# Patient Record
Sex: FEMALE | Race: OTHER
[De-identification: names, ages, dates, MRNs, and addresses within clinical notes are randomized per-mention and may not be internally consistent; named-entity substitution may affect disease eponyms.]

---

## 2022-09-01 ENCOUNTER — HOSPITAL ENCOUNTER (INPATIENT)
Dept: HOSPITAL 54 - TELE | Age: 67
LOS: 2 days | Discharge: HOME | DRG: 47 | End: 2022-09-03
Attending: NURSE PRACTITIONER | Admitting: NURSE PRACTITIONER
Payer: MEDICAID

## 2022-09-01 VITALS — BODY MASS INDEX: 31.15 KG/M2 | WEIGHT: 165 LBS | HEIGHT: 61 IN

## 2022-09-01 DIAGNOSIS — R20.2: ICD-10-CM

## 2022-09-01 DIAGNOSIS — E11.65: ICD-10-CM

## 2022-09-01 DIAGNOSIS — M54.10: ICD-10-CM

## 2022-09-01 DIAGNOSIS — N39.0: ICD-10-CM

## 2022-09-01 DIAGNOSIS — Z79.84: ICD-10-CM

## 2022-09-01 DIAGNOSIS — E87.1: ICD-10-CM

## 2022-09-01 DIAGNOSIS — I10: ICD-10-CM

## 2022-09-01 DIAGNOSIS — G45.9: Primary | ICD-10-CM

## 2022-09-01 DIAGNOSIS — E83.42: ICD-10-CM

## 2022-09-01 DIAGNOSIS — D64.9: ICD-10-CM

## 2022-09-01 DIAGNOSIS — E03.8: ICD-10-CM

## 2022-09-01 DIAGNOSIS — R29.700: ICD-10-CM

## 2022-09-01 DIAGNOSIS — E11.41: ICD-10-CM

## 2022-09-01 PROCEDURE — C9113 INJ PANTOPRAZOLE SODIUM, VIA: HCPCS

## 2022-09-01 PROCEDURE — G0378 HOSPITAL OBSERVATION PER HR: HCPCS

## 2022-09-02 VITALS — SYSTOLIC BLOOD PRESSURE: 96 MMHG | DIASTOLIC BLOOD PRESSURE: 50 MMHG

## 2022-09-02 VITALS — DIASTOLIC BLOOD PRESSURE: 69 MMHG | SYSTOLIC BLOOD PRESSURE: 142 MMHG

## 2022-09-02 VITALS — DIASTOLIC BLOOD PRESSURE: 50 MMHG | SYSTOLIC BLOOD PRESSURE: 96 MMHG

## 2022-09-02 VITALS — SYSTOLIC BLOOD PRESSURE: 104 MMHG | DIASTOLIC BLOOD PRESSURE: 46 MMHG

## 2022-09-02 VITALS — SYSTOLIC BLOOD PRESSURE: 142 MMHG | DIASTOLIC BLOOD PRESSURE: 69 MMHG

## 2022-09-02 VITALS — DIASTOLIC BLOOD PRESSURE: 64 MMHG | SYSTOLIC BLOOD PRESSURE: 114 MMHG

## 2022-09-02 VITALS — DIASTOLIC BLOOD PRESSURE: 52 MMHG | SYSTOLIC BLOOD PRESSURE: 102 MMHG

## 2022-09-02 VITALS — SYSTOLIC BLOOD PRESSURE: 119 MMHG | DIASTOLIC BLOOD PRESSURE: 63 MMHG

## 2022-09-02 LAB
ALBUMIN SERPL BCP-MCNC: 2.7 G/DL (ref 3.4–5)
ALP SERPL-CCNC: 61 U/L (ref 46–116)
ALT SERPL W P-5'-P-CCNC: 16 U/L (ref 12–78)
AST SERPL W P-5'-P-CCNC: 9 U/L (ref 15–37)
BASOPHILS # BLD AUTO: 0 K/UL (ref 0–0.2)
BASOPHILS NFR BLD AUTO: 0.2 % (ref 0–2)
BILIRUB SERPL-MCNC: 0.4 MG/DL (ref 0.2–1)
BUN SERPL-MCNC: 15 MG/DL (ref 7–18)
CALCIUM SERPL-MCNC: 8.2 MG/DL (ref 8.5–10.1)
CHLORIDE SERPL-SCNC: 103 MMOL/L (ref 98–107)
CHOLEST SERPL-MCNC: 145 MG/DL (ref ?–200)
CO2 SERPL-SCNC: 23 MMOL/L (ref 21–32)
CREAT SERPL-MCNC: 0.8 MG/DL (ref 0.6–1.3)
EOSINOPHIL NFR BLD AUTO: 1.3 % (ref 0–6)
GLUCOSE SERPL-MCNC: 268 MG/DL (ref 74–106)
HCT VFR BLD AUTO: 35 % (ref 33–45)
HDLC SERPL-MCNC: 46 MG/DL (ref 40–60)
HGB BLD-MCNC: 11.4 G/DL (ref 11.5–14.8)
LDLC SERPL DIRECT ASSAY-MCNC: 72 MG/DL (ref 0–99)
LYMPHOCYTES NFR BLD AUTO: 1.5 K/UL (ref 0.8–4.8)
LYMPHOCYTES NFR BLD AUTO: 29.2 % (ref 20–44)
MAGNESIUM SERPL-MCNC: 1.6 MG/DL (ref 1.8–2.4)
MCHC RBC AUTO-ENTMCNC: 33 G/DL (ref 31–36)
MCV RBC AUTO: 87 FL (ref 82–100)
MONOCYTES NFR BLD AUTO: 0.4 K/UL (ref 0.1–1.3)
MONOCYTES NFR BLD AUTO: 8.2 % (ref 2–12)
NEUTROPHILS # BLD AUTO: 3.1 K/UL (ref 1.8–8.9)
NEUTROPHILS NFR BLD AUTO: 61.1 % (ref 43–81)
PLATELET # BLD AUTO: 229 K/UL (ref 150–450)
POTASSIUM SERPL-SCNC: 3.6 MMOL/L (ref 3.5–5.1)
PROT SERPL-MCNC: 6 G/DL (ref 6.4–8.2)
RBC # BLD AUTO: 3.99 MIL/UL (ref 4–5.2)
SODIUM SERPL-SCNC: 135 MMOL/L (ref 136–145)
TRIGL SERPL-MCNC: 257 MG/DL (ref 30–150)
TSH SERPL DL<=0.005 MIU/L-ACNC: 4.81 UIU/ML (ref 0.36–3.74)
WBC NRBC COR # BLD AUTO: 5.1 K/UL (ref 4.3–11)

## 2022-09-02 RX ADMIN — SODIUM CHLORIDE SCH MG: 9 INJECTION, SOLUTION INTRAVENOUS at 08:12

## 2022-09-02 RX ADMIN — Medication SCH EACH: at 21:16

## 2022-09-02 RX ADMIN — CLOPIDOGREL BISULFATE SCH MG: 75 TABLET, FILM COATED ORAL at 08:12

## 2022-09-02 RX ADMIN — Medication SCH EACH: at 22:22

## 2022-09-02 RX ADMIN — Medication SCH EACH: at 12:26

## 2022-09-02 RX ADMIN — Medication SCH EACH: at 16:51

## 2022-09-02 RX ADMIN — Medication SCH EACH: at 05:44

## 2022-09-02 RX ADMIN — Medication SCH MG: at 08:12

## 2022-09-02 RX ADMIN — NITROFURANTOIN MONOHYDRATE AND NITROFURANTOIN, MACROCRYSTALLINE SCH MG: 75; 25 CAPSULE ORAL at 21:15

## 2022-09-02 RX ADMIN — ACETAMINOPHEN PRN MG: 325 TABLET ORAL at 08:12

## 2022-09-02 RX ADMIN — INSULIN HUMAN PRN UNITS: 100 INJECTION, SOLUTION PARENTERAL at 22:25

## 2022-09-02 NOTE — NUR
TELE RN CLOSING NOTES



PATIENT RESTING IN BED, RESPONDS TO VERBAL AND TACTILE STIMULI. A/Ox3, Guatemalan SPEAKING, 
DAUGHTER AT BEDSIDE. STABLE ON ROOM AIR, NO S/S OF SOB OR RESPIRATORY DISTRESS. IV ACCESS 
RAC #20 SL. INTACT AND PATENT. PATIENT IS ON TELE MONITORING SHOWING SR 72, NO S/S OF 
CARDIAC DISTRESS. AMBULATORY WITH ASSIST, USES BEDSIDE COMMODE OR BATHROOM WITH ASSISTANCE. 
SKIN IS INTACT. SAFETY MEASURES MAINTAINED: BED LOCKED AND IN LOWEST POSITION, CALL LIGHT 
WITHIN REACH, HOB ELEVATED, SIDE RAILS UP x2. WILL ENDORSE TO NEXT SHIFT ANY YAS.

## 2022-09-02 NOTE — NUR
MS RN NOTES  ACCU-CHECK

BLOOD SUGAR CHECK 306,REFUSED INSULIN,PHONE MADE TO FAMILY MEMBER SPOKE TO PETE 
DAUGHTER,EXPLAINED RISK AND BENEFITS FOR NOT HAVING INSULIN,AGREED TO HAVE MOM SLIDING 
SCALE,MILD COVERAGE ONLY,NOTED

## 2022-09-02 NOTE — NUR
TELE RN ADMISSION NOTES

RECEIVED DIRECT ADMIT FROM [place],THIS 66 YO FEMALE,A/O X3,SPEAK Emirati,BREATHING 
REGULAR,NOT IN ANY FORM OF RESPIRATORY DISTRESS,WITH CHIEF COMPLAINTS OF DIZZINESS AND 
NUMBNESS ON LOWER EXTREMITIES.WITH SALINE LOCK RIGHT AC FROM [place], INTACT AND PATENT.NO 
SKIN ISSUES,HOSPITALIST RAFAEL MADE AWARE OF THE ADMISSION,AWAITING FOR ORDERS.CALL LIGHT IN 
REACH,NEEDS ANTICIPATED.

## 2022-09-02 NOTE — NUR
TELE RN OPENING NOTES



RECEIVED PATIENT SLEEPING IN BED, RESPONDS TO VERBAL AND TACTILE STIMULI. A/Ox3, Azeri 
SPEAKING. ON ROOM AIR, NO S/S OF SOB OR RESPIRATORY DISTRESS. IV ACCESS RAC #20 SL. INTACT 
AND PATENT. PATIENT IS ON TELE MONITORING SHOWING SR 74, NO S/S OF CARDIAC DISTRESS. 
AMBULATORY WITH ASSIST, USES BED PAN OR BATHROOM WITH ASSISTANCE. SKIN IS INTACT. SAFETY 
MEASURES IN PLACE: BED LOCKED AND IN LOWEST POSITION, CALL LIGHT WITHIN REACH, HOB ELEVATED, 
SIDE RAILS UP x2. WILL CONTINUE TO MONITOR.

## 2022-09-02 NOTE — NUR
TELE RN NOTES

CT OF THE BRAIN WITH CONTRAST,WITH CONSENT SIGNED BT PATIENT, WAS DONE BY RADIOLOGY,AWAITING 
FOR RESULT.

## 2022-09-02 NOTE — NUR
TELE RN NOTES   ACCU-CHECK

BLOOD SUGAR CHECK 245,REFUSED TO HAVE INSULIN COVERAGE.WILL ENDORSE TO THE NURSE ASSIGNED.

## 2022-09-02 NOTE — NUR
TELE RN NOTES

SR-72 ON TELE MONITOR,ABLE TO GET OUT OF BED TO BEDSIDE COMMODE ASSISTED BY AGUSTIN FREY,UNSTEADY GAIT DUE TO WEAKNESS ON LOWER EXTREMITIES.IN NO ACUTE DISTRESS.

## 2022-09-02 NOTE — NUR
TELE RN NOTES

SWALLOW EVAL DONE BY NURSE ASSIGNED,PASSED 5ML AND 60 ML WATER SWALLOW, NO COUGH,NO CHOKING 
NOTED.NP RAFAEL MADE AWARE,WITH ORDER FOR CARDIAC.LOW CARD DIET IN THIS MORNING.NIHSS STROKE 
SCALE ALSO DONE AND ITS WITH IN NORMAL LIMITS.

## 2022-09-02 NOTE — NUR
MS RN NOTES

HOSPITALIST RAFAEL MADE AWARE,WITH ORDER,OKAY PATIENT TO HAVE MILD SLIDING SCALE,NOTED AND 
CARRIED OT.

## 2022-09-02 NOTE — NUR
TELE RN NOTES

SR-92 ON TELE MONITOR.RECEIVED SITTING ON EDGE OF BED,WITH DAUGHTER AT BEDSIDE.SALINE LOCK 
RIGHT AC INTACT AND PATENT.DENIES DISCOMFORTS AT THE MOMENT,CALL LIGHT IN REACH,NEEDS 
ANTICIPATED.

## 2022-09-02 NOTE — NUR
SS consult: 



SS Consult requested for code stroke. The pt. is a 67-year-old [place] female who was 
admitted to [facility] due to possible TIA per EMR. SW met with pt. bedside. The pt.s 
daughter &  are at bedside. SW completed interview in English. The pt. was alert & 
oriented x 4 and makes good eye contact. The pt. appears well-groomed. Pt. denies SI/HI and 
denies hallucinations. Pt. stated that she lives in [place] but is currently residing in her 
daughters [place]ne[place] [phone] home [[address] [place] [address]]. Pt. 
states that she came into the hospital after she has a ground level fall incident where was 
walking out of a restaurant and pt. states, she hit her head. Pt. also reported that she hit 
her knee and feels weakness and is unsure if she can walk independently. SW provided 
empowerment after stroke educational material and pt. accepted it. Pt. denies drug or 
alcohol use. Pt. denies history of mental health illness. Pt. stated her support system is 
her  and daughter, Maria L [phone].



Plan: Pt. stated she will be returning to her daughter, Sofy Mcneil [phone] home [[id number] [place] [id number]] when ready for discharge and family to provide 
transportation. SW provided stroke empowerment resources. Pt. accepted them. Pt. scored a 1 
on the post stroke depression scale.

## 2022-09-03 VITALS — SYSTOLIC BLOOD PRESSURE: 128 MMHG | DIASTOLIC BLOOD PRESSURE: 68 MMHG

## 2022-09-03 VITALS — SYSTOLIC BLOOD PRESSURE: 102 MMHG | DIASTOLIC BLOOD PRESSURE: 61 MMHG

## 2022-09-03 VITALS — DIASTOLIC BLOOD PRESSURE: 57 MMHG | SYSTOLIC BLOOD PRESSURE: 116 MMHG

## 2022-09-03 LAB
BASOPHILS # BLD AUTO: 0 K/UL (ref 0–0.2)
BASOPHILS NFR BLD AUTO: 0.1 % (ref 0–2)
BUN SERPL-MCNC: 12 MG/DL (ref 7–18)
CALCIUM SERPL-MCNC: 8.6 MG/DL (ref 8.5–10.1)
CHLORIDE SERPL-SCNC: 105 MMOL/L (ref 98–107)
CO2 SERPL-SCNC: 26 MMOL/L (ref 21–32)
CREAT SERPL-MCNC: 0.7 MG/DL (ref 0.6–1.3)
EOSINOPHIL NFR BLD AUTO: 0.8 % (ref 0–6)
GLUCOSE SERPL-MCNC: 190 MG/DL (ref 74–106)
HCT VFR BLD AUTO: 37 % (ref 33–45)
HGB BLD-MCNC: 12.1 G/DL (ref 11.5–14.8)
LYMPHOCYTES NFR BLD AUTO: 1.3 K/UL (ref 0.8–4.8)
LYMPHOCYTES NFR BLD AUTO: 18 % (ref 20–44)
MAGNESIUM SERPL-MCNC: 1.8 MG/DL (ref 1.8–2.4)
MCHC RBC AUTO-ENTMCNC: 33 G/DL (ref 31–36)
MCV RBC AUTO: 86 FL (ref 82–100)
MONOCYTES NFR BLD AUTO: 0.6 K/UL (ref 0.1–1.3)
MONOCYTES NFR BLD AUTO: 8.6 % (ref 2–12)
NEUTROPHILS # BLD AUTO: 5.1 K/UL (ref 1.8–8.9)
NEUTROPHILS NFR BLD AUTO: 72.5 % (ref 43–81)
PLATELET # BLD AUTO: 254 K/UL (ref 150–450)
POTASSIUM SERPL-SCNC: 3.4 MMOL/L (ref 3.5–5.1)
RBC # BLD AUTO: 4.25 MIL/UL (ref 4–5.2)
SODIUM SERPL-SCNC: 139 MMOL/L (ref 136–145)
WBC NRBC COR # BLD AUTO: 7 K/UL (ref 4.3–11)

## 2022-09-03 RX ADMIN — Medication SCH MG: at 09:53

## 2022-09-03 RX ADMIN — NITROFURANTOIN MONOHYDRATE AND NITROFURANTOIN, MACROCRYSTALLINE SCH MG: 75; 25 CAPSULE ORAL at 09:53

## 2022-09-03 RX ADMIN — SODIUM CHLORIDE SCH MG: 9 INJECTION, SOLUTION INTRAVENOUS at 09:53

## 2022-09-03 RX ADMIN — INSULIN HUMAN PRN UNITS: 100 INJECTION, SOLUTION PARENTERAL at 13:01

## 2022-09-03 RX ADMIN — Medication SCH EACH: at 05:57

## 2022-09-03 RX ADMIN — INSULIN HUMAN PRN UNITS: 100 INJECTION, SOLUTION PARENTERAL at 05:59

## 2022-09-03 RX ADMIN — ACETAMINOPHEN PRN MG: 325 TABLET ORAL at 03:39

## 2022-09-03 RX ADMIN — Medication SCH EACH: at 12:22

## 2022-09-03 RX ADMIN — CLOPIDOGREL BISULFATE SCH MG: 75 TABLET, FILM COATED ORAL at 09:53

## 2022-09-03 RX ADMIN — ACETAMINOPHEN PRN MG: 325 TABLET ORAL at 13:19

## 2022-09-03 NOTE — NUR
TELE RN NOTES

FAIRLY RESTED AT NIGHT,BLOOD SUGAR TRENDING DOWN,ALL DUE MEDS GIVEN,CALL LIGHT IN 
REACH,NEEDS ATTENDED.

## 2022-09-03 NOTE — NUR
TELE RN NOTES  ACCU-CHECK

BLOOD SUGAR CHECK 184,COVERED WITH HUMULIN R 3 UNITS PER MILD SLIDING SCALE.

## 2022-09-03 NOTE — NUR
ms rn

received on bed, awake,alert,oriented x3,not in any form of distress, nsr on monitor,denies 
chest pain at this time, no sob noted, abdomen soft,positive bowel sounds, call light w/in 
reach, all needs attended.

## 2022-09-03 NOTE — NUR
ms rn

was seen by mary quesada, instructed patient to go home today w/ prescription, family notified.